# Patient Record
Sex: MALE | URBAN - METROPOLITAN AREA
[De-identification: names, ages, dates, MRNs, and addresses within clinical notes are randomized per-mention and may not be internally consistent; named-entity substitution may affect disease eponyms.]

---

## 2023-03-09 ENCOUNTER — OFFICE VISIT (OUTPATIENT)
Dept: URBAN - METROPOLITAN AREA CLINIC 28 | Facility: CLINIC | Age: 63
End: 2023-03-09
Payer: COMMERCIAL

## 2023-03-09 DIAGNOSIS — H01.00A BLEPHARITIS OF RIGHT EYE, UPPER AND LOWER EYELIDS: Primary | ICD-10-CM

## 2023-03-09 DIAGNOSIS — H01.00B BLEPHARITIS OF LEFT EYE, UPPER AND LOWER EYELIDS: ICD-10-CM

## 2023-03-09 DIAGNOSIS — H35.362 DRUSEN OF MACULA OF LEFT EYE: ICD-10-CM

## 2023-03-09 PROCEDURE — 92004 COMPRE OPH EXAM NEW PT 1/>: CPT | Performed by: OPTOMETRIST

## 2023-03-09 ASSESSMENT — INTRAOCULAR PRESSURE
OD: 14
OS: 20
OD: 15
OS: 18

## 2023-03-09 NOTE — IMPRESSION/PLAN
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 Plan: Educated on exam findings. Mild mottling OS. Monitor.

## 2023-03-09 NOTE — IMPRESSION/PLAN
Impression: Blepharitis of right eye, upper and lower eyelids: H01.00A. Plan: Educated on exam findings. Educated on blepharitis and impact on dry eye symptoms. Recommend daily lid scrubs. Monitor.